# Patient Record
Sex: FEMALE | Race: OTHER | HISPANIC OR LATINO | URBAN - METROPOLITAN AREA
[De-identification: names, ages, dates, MRNs, and addresses within clinical notes are randomized per-mention and may not be internally consistent; named-entity substitution may affect disease eponyms.]

---

## 2022-07-15 ENCOUNTER — EMERGENCY (EMERGENCY)
Facility: HOSPITAL | Age: 32
LOS: 1 days | Discharge: HOME | End: 2022-07-15
Admitting: EMERGENCY MEDICINE
Payer: COMMERCIAL

## 2022-07-15 VITALS
RESPIRATION RATE: 18 BRPM | DIASTOLIC BLOOD PRESSURE: 72 MMHG | HEART RATE: 82 BPM | SYSTOLIC BLOOD PRESSURE: 109 MMHG | WEIGHT: 130.07 LBS | OXYGEN SATURATION: 100 % | HEIGHT: 64 IN | TEMPERATURE: 99 F

## 2022-07-15 PROCEDURE — 99282 EMERGENCY DEPT VISIT SF MDM: CPT

## 2022-07-15 PROCEDURE — 99283 EMERGENCY DEPT VISIT LOW MDM: CPT

## 2022-07-15 NOTE — ED PROVIDER NOTE - NSFOLLOWUPINSTRUCTIONS_ED_ALL_ED_FT
RICE Therapy for Routine Care of Injuries  The routine care of many injuries includes rest, ice, compression, and elevation (RICE therapy). RICE therapy is often recommended for injuries to soft tissues, such as muscle strain, sprains, bruises, and overuse injuries. It can also be used for some bone injuries. Using RICE therapy can help to relieve pain and lessen swelling.  Supplies needed:  •Ice.  •Plastic bag.  •Towel.  •Elastic bandage.  •Pillow or pillows to raise (elevate) the injured body part.  How to care for your injury with RICE therapy  Rest   Rest your injury. This may help with the healing process. Rest usually involves limiting your normal activities and not using the injured part of your body. Generally, you can return to your normal activities when your health care provider says it is okay and you can do them without much discomfort.  If you rest the injury too much, it may not heal as well. Some injuries heal better with early movement instead of resting for too long. Talk with your health care provider about how you should limit your activities and whether you should start range-of-motion exercises for your injury.  Ice   Ice your injury to lessen swelling and pain. Do not apply ice directly to your skin.  •Put ice in a plastic bag.  •Place a towel between your skin and the bag.  •Leave the ice on for 20 minutes, 2–3 times a day. Use ice on as many days as told by your health care provider.  Compression  Put pressure (compression) on your injured area to control swelling, give support, and help with discomfort. Compression may be done with an elastic bandage. If an elastic bandage has been applied, follow these general tips:  •Use the bandage as directed by the maker of the bandage that you are using.  •Do not wrap the bandage too tightly. That may block (cut off) circulation in the arm or leg in the area below the bandage.   •If part of your body beyond the bandage becomes blue, numb, cold, swollen, or more painful, your bandage is probably too tight. If this occurs, remove your bandage and reapply it more loosely.  •Remove and reapply the bandage every 3–4 hours or as told by your health care provider.  •See your health care provider if the bandage seems to be making your problems worse rather than better.  Elevation  Elevate your injured area to lessen swelling and pain. If possible, elevate your injured area at or above the level of your heart or the center of your chest.  Contact a health care provider if:  •Your pain and swelling continue.  •Your symptoms are getting worse rather than improving.  Having these problems may mean that you need further evaluation or imaging tests, such as X-rays or an MRI. Sometimes, X-rays may not show a small broken bone (fracture) until days after the injury happened. Make a follow-up appointment with your health care provider. Ask your health care provider, or the department that is doing the imaging test, when your results will be ready.  Get help right away if:  •You have sudden severe pain at or below the area of your injury.  •You have redness or increased swelling around your injury.  •You have tingling or numbness at or below the area of your injury and it does not improve after you remove the elastic bandage.  Summary  •The routine care of many injuries includes rest, ice, compression, and elevation (RICE therapy). Using RICE therapy can help to relieve pain and lessen swelling.  •RICE therapy is often recommended for injuries to soft tissues, such as muscle strain, sprains, bruises, and overuse injuries. It can also be used for some bone injuries.  •Seek medical care if your pain and swelling continue or if your symptoms are getting worse rather than improving.

## 2022-07-15 NOTE — ED PROVIDER NOTE - CARE PROVIDER_API CALL
Yanelis Feliciano)  Orthopaedic Surgery Surgery  [address] [address]  Phone: [phone]  Fax: [phone]  Follow Up Time:     Fernando Moore)  Orthopaedic Surgery  [address] [place] [address]  Phone: [phone]  Fax: [phone]  Follow Up Time:     Grey Romero)  Orthopaedic Surgery  [address] [place] [address], [address]  [place] [address]  Phone: [phone]  Fax: [phone]  Follow Up Time:

## 2022-07-15 NOTE — ED PROVIDER NOTE - CARE PROVIDERS DIRECT ADDRESSES
[email or web address],DirectAddress_Unknown,DirectAddress_Unknown

## 2022-07-15 NOTE — ED PROVIDER NOTE - PATIENT PORTAL LINK FT
You can access the FollowMyHealth Patient Portal offered by [facility] by registering at the following website: [email or web address]. By joining [employer]’s FollowMyHealth portal, you will also be able to view your health information using other applications (apps) compatible with our system.

## 2022-07-15 NOTE — ED ADULT NURSE NOTE - OBJECTIVE STATEMENT
Patient presents AOX4 c/o pain to L knee s/p falling down stairs x 1 week ago. Arrives ambulatory, +weight bearing. Patient tearful, reports pain worsening and radiating to L ankle. +swelling. No obvious deformity noted. Patient presents AOX4 c/o pain to L knee s/p falling down stairs x 1 week ago. Arrives ambulatory, +weight bearing. Patient tearful, reports pain worsening and radiating to L ankle. No obvious deformity noted.

## 2022-07-15 NOTE — ED PROVIDER NOTE - OBJECTIVE STATEMENT
The pt is a 30 y/o F, who presents to ED requesting MRI of knee -- fell 2 wks ago and injured L knee, seen at [facility] and had an xray that showed joint effusion and was advised to f/u w/ortho for MRI, pt states that her appointment is "too far away". Pain to L knee and now radiates to ankle, 6/10, aggravated w/walking and moving, has been taking NSAIDs. Denies numbness or tingling to toes, decreased ROM, calf pain or swelling, head trauma/loc.

## 2022-07-15 NOTE — ED PROVIDER NOTE - CLINICAL SUMMARY MEDICAL DECISION MAKING FREE TEXT BOX
pt s/p fall 2 wks ago, had xrays of knee done and advised to f/u w/ortho for mri, presents to ED wanting mri done, pt w/xray results w/her - showed small joint effusion/no fx. FROM of knee on exam, + crepitus. pt explained that mri of knee is not an emergent study, nor indicated from ed. ace wrap applied for ambulatory comfort / support, cane provided, to RICE and f/u w/ortho for outpatient imaging and further tx, to con't prn nsaids, pt understands and agrees w/plan

## 2022-07-15 NOTE — ED PROVIDER NOTE - PROVIDER TOKENS
PROVIDER:[TOKEN:[[id number]]],PROVIDER:[TOKEN:[[id number]]],PROVIDER:[TOKEN:[[id number]]]

## 2022-07-15 NOTE — ED PROVIDER NOTE - MUSCULOSKELETAL, MLM
Spine appears normal, range of motion is not limited, no muscle or joint tenderness; L Knee: quads intact, no patella tend, + infra patella crepitus, FROM, no increased ligamentous laxity appreciated, muscle strength 5/5, good resistance, no tib/fib tend, no tend over medial or lateral malleolus, achilles tendon intact, pedal pulse 2+, no tend over metatarsals

## 2022-07-15 NOTE — ED ADULT NURSE NOTE - NSIMPLEMENTINTERV_GEN_ALL_ED
Implemented All Universal Safety Interventions:  [place] to call system. Call bell, personal items and telephone within reach. Instruct patient to call for assistance. Room bathroom lighting operational. Non-slip footwear when patient is off stretcher. Physically safe environment: no spills, clutter or unnecessary equipment. Stretcher in lowest position, wheels locked, appropriate side rails in place.

## 2022-07-18 DIAGNOSIS — M23.8X2 OTHER INTERNAL DERANGEMENTS OF LEFT KNEE: ICD-10-CM
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
## 2022-07-18 DIAGNOSIS — M25.462 EFFUSION, LEFT KNEE: ICD-10-CM

## 2022-07-18 DIAGNOSIS — S89.82XA OTHER SPECIFIED INJURIES OF LEFT LOWER LEG, INITIAL ENCOUNTER: ICD-10-CM
